# Patient Record
Sex: MALE | URBAN - METROPOLITAN AREA
[De-identification: names, ages, dates, MRNs, and addresses within clinical notes are randomized per-mention and may not be internally consistent; named-entity substitution may affect disease eponyms.]

---

## 2023-02-16 ENCOUNTER — CLINICAL SUPPORT (OUTPATIENT)
Dept: OTHER | Facility: CLINIC | Age: 78
End: 2023-02-16

## 2023-02-16 DIAGNOSIS — Z00.8 ENCOUNTER FOR OTHER GENERAL EXAMINATION: ICD-10-CM

## 2023-02-17 VITALS
DIASTOLIC BLOOD PRESSURE: 70 MMHG | WEIGHT: 156.63 LBS | HEIGHT: 66 IN | SYSTOLIC BLOOD PRESSURE: 132 MMHG | BODY MASS INDEX: 25.17 KG/M2

## 2023-02-17 LAB
GLUCOSE SERPL-MCNC: 101 MG/DL (ref 60–140)
HDLC SERPL-MCNC: 28 MG/DL
POC CHOLESTEROL, TOTAL: 99 MG/DL

## 2023-03-08 NOTE — PROGRESS NOTES
FOLLOW UP: No F/U call needed  Rx MEDS FOR: Hypertension; Cholesterol; DM (Not taken today but takes as prescibed)  DISCUSSED: Diet/food choice; Exercise/increased activity; Monitor BP, log and share results with MD; Monitor Glucose, log and share results with MD;  AS RELATED TO: Cholesterol levels; Glucose levels; Blood Pressure; Health maintenance & well-being  PCP STATUS: Has PCP  REC FOLLOW-UP WITH PCP: Will schedule on own WHEN: annually FOR: R/T Cholesterol; R/T BP; R/T Glucose; Medication Refills; Routine wellness visit and lab work  NOTES: Needs help seeing if insurance can cover BP and BG home machines. Currently does not check either at home!!! Given cholesterol guidelines to help with HDLs. RIKI Reed    Onsite consult was completed. Screening results and educational material were sent to the participant.